# Patient Record
Sex: FEMALE | Race: WHITE | NOT HISPANIC OR LATINO | Employment: FULL TIME | RURAL
[De-identification: names, ages, dates, MRNs, and addresses within clinical notes are randomized per-mention and may not be internally consistent; named-entity substitution may affect disease eponyms.]

---

## 2022-02-10 ENCOUNTER — LAB VISIT (OUTPATIENT)
Dept: LAB | Facility: HOSPITAL | Age: 47
End: 2022-02-10
Attending: NURSE PRACTITIONER
Payer: COMMERCIAL

## 2022-02-10 DIAGNOSIS — E27.40 UNSPECIFIED ADRENOCORTICAL INSUFFICIENCY: Primary | ICD-10-CM

## 2022-02-10 PROCEDURE — 36415 COLL VENOUS BLD VENIPUNCTURE: CPT

## 2022-02-10 PROCEDURE — 82384 ASSAY THREE CATECHOLAMINES: CPT | Mod: 90

## 2022-02-10 PROCEDURE — 84402 ASSAY OF FREE TESTOSTERONE: CPT | Mod: 90

## 2022-02-10 PROCEDURE — 84403 ASSAY OF TOTAL TESTOSTERONE: CPT | Mod: 90

## 2022-02-17 LAB
COLLECT DURATION TIME UR: 24 H
DOPAMINE 24H UR-MRATE: 110 MCG/24 H (ref 65–400)
EPINEPH 24H UR-MRATE: 1.2 MCG/24 H
NOREPINEPH 24H UR-MRATE: 25 MCG/24 H (ref 15–80)
SPECIMEN VOL 24H UR: 700 ML

## 2022-02-18 LAB
TESTOST FREE SERPL-MCNC: 0.63 NG/DL (ref 0.06–0.95)
TESTOST SERPL-MCNC: 30 NG/DL (ref 8–60)

## 2022-02-23 DIAGNOSIS — M54.9 DORSALGIA: Primary | ICD-10-CM

## 2022-02-23 DIAGNOSIS — M54.50 LOW BACK PAIN: ICD-10-CM

## 2022-03-24 ENCOUNTER — CLINICAL SUPPORT (OUTPATIENT)
Dept: REHABILITATION | Facility: HOSPITAL | Age: 47
End: 2022-03-24
Payer: COMMERCIAL

## 2022-03-24 DIAGNOSIS — M54.50 LOW BACK PAIN, UNSPECIFIED BACK PAIN LATERALITY, UNSPECIFIED CHRONICITY, UNSPECIFIED WHETHER SCIATICA PRESENT: ICD-10-CM

## 2022-03-24 DIAGNOSIS — M54.50 LOW BACK PAIN: ICD-10-CM

## 2022-03-24 PROCEDURE — 97163 PT EVAL HIGH COMPLEX 45 MIN: CPT

## 2022-03-24 NOTE — PLAN OF CARE
[facility]   Physical Therapy Initial Evaluation    Name: Jaky Brown  Clinic Number: [id number]    Therapy Diagnosis:   Encounter Diagnosis   Name Primary?    Low back pain      Physician: Liliana Wolff F*    Physician Orders: Eval and treat    Evaluation Date: 3/24/2022      Visit # / Visits authorized: 6      Total Appointment Time (timed & untimed codes): 30 minutes      Subjective   Date of onset: > 6 months  History of current condition - Shante reports: Having history of low back pain.  She reports pain is usually worse with lifting or yard work.       Medical History:   BMI> 30, Anxiety, Asthma, Back pain, depression    Surgical History:   Jaky Brown  has no past surgical history on file.    Medications:   Jaky currently has no medications in their medication list.    Allergies:   Review of patient's allergies indicates:  Not on File       Prior Therapy: N/A  Social History: Family  Occupation: Homemaker  Prior Level of Function: Independent  Current Level of Function: See Foto tool scanned into media    Pain:  Current 6/10, worst 8/10, best 5/10   Location: bilateral back   Description: Aching and Deep  Aggravating Factors: Bending, Flexing and Lifting  Easing Factors: pain medication and rest    Pts goals: I want my back to get better    Objective     Forward bend: WFL  Back bend:  Limited  L  Trunk rotation: right 45 left 40    MANUAL MUSCLE TEST  Right left   Hip flexion MMT number: 5/5 MMT strength: 5/5   Hip abduction MMT strength: 4/5 MMT strength: 4+/5   Knee extension MMT strength: 5/5 MMT strength: 5/5   Knee flexion  MMT strength: 5/5 MMT strength: 5/5        Sit up Limited , increased difficulty noted           ROM/flexibility right left   Hip flexion (120) WFL WFL   Internal rotation (45) WFL WFL   External rotation (45) WFL WFL   Hamstring 90/90 (-10) -17 -15   Rectus femoris (120) Limited Limited               Special test Right  Left    SLR test <  60 degrees Negative Negative   SLR test > 60 degrees Negative Negative   Sitting slump test Negative Negative   Piriformis test Positive Negative   DORETHA test Negative Negative                    Gait assessment:   WFL no assistive device      Other test/information:    Bed Mobility: extra time and increased difficulty noted roll R/L    Palpation: Point tender L3-4, crepitus noted right knee      Limitation/Restriction for FOTO  Survey    Therapist reviewed FOTO scores for Jaky Brown on 3/24/2022.   FOTO documents entered into [employer] - see Media section.    Limitation Score: 57/100%         TREATMENT       Shante received the treatments listed below:  Eval only due to precert required ([id number])    Home Exercises and Patient Education Provided    Education provided:   - Next session    See EMR under Media for exercises provided next visit.    Assessment   Jaky is a 46 y.o. female referred to outpatient Physical Therapy with a medical diagnosis of low back pain.. Pt presents with positive MRI for disk herniation, decreased flexibility, difficulty with heavy lifting and pain.    Pt prognosis is Excellent.   Pt will benefit from skilled outpatient Physical Therapy to address the deficits stated above and in the chart below, provide pt/family education, and to maximize pt's level of independence.     Plan of care discussed with patient: Yes  Pt's spiritual, cultural and educational needs considered and patient is agreeable to the plan of care and goals as stated below:     Anticipated Barriers for therapy: None      Goals:  Short Term Goals: 1 weeks   1) I HOME EXERCISE PROGRAM  2) tolerate 30min of activity  3) decrease pain to 5/10    Long Term Goals: 3 weeks   1) increase function to 65/100% on FOTO tool  2) decrease pain to 3/10  3) normalize LE flexibilty    Plan   Plan of care Certification: 3/24/2022 to 4/24/2022.    Outpatient Physical Therapy 3 times weekly for 3 weeks total of 6 visits to include the  following interventions: Moist Heat/ Ice, Therapeutic Activities and Therapeutic Exercise.     Idris Shea PT         Physician:___________________________________________    Date:_______________________________________________

## 2022-03-28 ENCOUNTER — CLINICAL SUPPORT (OUTPATIENT)
Dept: REHABILITATION | Facility: HOSPITAL | Age: 47
End: 2022-03-28
Payer: COMMERCIAL

## 2022-03-28 DIAGNOSIS — M54.50 LOW BACK PAIN, UNSPECIFIED BACK PAIN LATERALITY, UNSPECIFIED CHRONICITY, UNSPECIFIED WHETHER SCIATICA PRESENT: Primary | ICD-10-CM

## 2022-03-28 PROCEDURE — 97110 THERAPEUTIC EXERCISES: CPT | Mod: CQ

## 2022-03-28 NOTE — PROGRESS NOTES
Physical Therapy Treatment Note     Name: Jaky Brown  Clinic Number: [id number]    Therapy Diagnosis:   Encounter Diagnosis   Name Primary?   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  Compliance with home exercise program: n/a; HEP given today.  Response to previous treatment: n/a  Functional change: n/a    Pain: 7/10  Location: across low back.    Objective     Shante received therapeutic exercises to develop strength, flexibility and core stabilization for 38 minutes including:  Exercises Day 2 Day 3 Day 4 Day 5  Day 6 Day 7  (sup Visit) Day 8 Day 9 Day 10  Day 11 Day 12   yeny hamstring stretch 0u16zmcw             yeny piriformis stretch 3l16aecs             yeny lumbar rotation 85p11jqhh             yeny SL quad stretch 6f78gvbg                                                                                           Shante received hot pack for 15 minutes to full back area.          Home Exercises Provided and Patient Education Provided     Education provided: Proper technique for supine<>sit transfers to avoid unnecessary back strain.    Written Home Exercises Provided: yes.  Exercises were reviewed and Shante was able to demonstrate them prior to the end of the session.  Shante demonstrated good  understanding of the education provided.     See EMR under Media for exercises provided prior visit.    Assessment       Shante Is progressing well towards her goals. Reports feeling less  tightness and pain post treatment.  Pt prognosis is Good.     Pt will continue to benefit from skilled outpatient physical therapy to address the deficits listed in the problem list box on initial evaluation, provide pt/family education and to maximize pt's level of independence in the home and community environment.     Pt's spiritual, cultural and educational needs considered and pt agreeable to plan of care and goals.     Anticipated barriers to physical therapy: none significant.    Goals:    Short Term Goals: 1 weeks   1) I HOME EXERCISE PROGRAM  2) tolerate 30min of activity  3) decrease pain to 5/10     Long Term Goals: 3 weeks   1) increase function to 65/100% on FOTO tool  2) decrease pain to 3/10  3) normalize LE flexibilty        Plan     Outpatient Physical Therapy 3 times weekly for 3 weeks total of 6 visits to include the following interventions: Moist Heat/ Ice, Therapeutic Activities and Therapeutic Exercise.        Juliette Rojo, PTA  3/28/2022

## 2022-04-01 ENCOUNTER — CLINICAL SUPPORT (OUTPATIENT)
Dept: REHABILITATION | Facility: HOSPITAL | Age: 47
End: 2022-04-01
Payer: COMMERCIAL

## 2022-04-01 DIAGNOSIS — M54.50 LOW BACK PAIN, UNSPECIFIED BACK PAIN LATERALITY, UNSPECIFIED CHRONICITY, UNSPECIFIED WHETHER SCIATICA PRESENT: Primary | ICD-10-CM

## 2022-04-01 PROCEDURE — 97530 THERAPEUTIC ACTIVITIES: CPT | Mod: CQ

## 2022-04-01 PROCEDURE — 97110 THERAPEUTIC EXERCISES: CPT | Mod: CQ

## 2022-04-01 NOTE — PROGRESS NOTES
"  Physical Therapy Treatment Note     Name: Jaky Brown  Clinic Number: [id number]    Therapy Diagnosis:   Encounter Diagnosis   Name Primary?    Low back pain, unspecified back pain laterality, unspecified chronicity, unspecified whether sciatica present Yes     Physician: Liliana Wolff F*    Visit Date: 4/1/2022    Physician Orders: PT eval and treat    Evaluation Date: 3/24/22    Visit # / Visits authorized: 3 / 7   PTA Visit #: 2        Precautions: none significant  Functional Level Prior to Evaluation: Independent    Subjective     Pt reports:   Compliance with home exercise program.  Response to previous treatment: mild soreness, not significant.  Functional change: little better.    Pain: 6/10  Location: across low back.    Objective     Shante received therapeutic exercises to develop strength, flexibility and core stabilization for 38 minutes including:  Exercises Day 2 Day 3 Day 4 Day 5  Day 6 Day 7  (sup Visit) Day 8 Day 9 Day 10  Day 11 Day 12   yeny hamstring stretch [id number] 2g23coxs            yeny piriformis stretch 5i01zbcr 9v33oisg            yeny lumbar rotation 66m57hmri 8n95fzxf            yeny SL quad stretch 4e33lldw 4c65cfzm            yeny SLR  11t8seng                          Theract    bike  lev2  10mins                                          Dynamic therapeutic activities completed as noted above to improve functional performance for 10 minutes.    VC's for correct therex technique.    Shante received hot pack for 15 minutes to full back area.          Home Exercises Provided and Patient Education Provided     Education provided: Proper technique for supine<>sit transfers to avoid unnecessary back strain.    Written Home Exercises Provided: Patient instructed to cont prior HEP.      Assessment       Shante Is progressing well towards her goals. Reports feeling less tightness post treatment and "I am feeling the new ones we did."  Pt prognosis is Good.     Pt will continue " to benefit from skilled outpatient physical therapy to address the deficits listed in the problem list box on initial evaluation, provide pt/family education and to maximize pt's level of independence in the home and community environment.     Pt's spiritual, cultural and educational needs considered and pt agreeable to plan of care and goals.     Anticipated barriers to physical therapy: none significant.    Goals:    Short Term Goals: 1 weeks   1) I HOME EXERCISE PROGRAM  2) tolerate 30min of activity  3) decrease pain to 5/10     Long Term Goals: 3 weeks   1) increase function to 65/100% on FOTO tool  2) decrease pain to 3/10  3) normalize LE flexibilty        Plan     Outpatient Physical Therapy 3 times weekly for 3 weeks total of 6 visits to include the following interventions: Moist Heat/ Ice, Therapeutic Activities and Therapeutic Exercise.      Delayed check in due to  issues.    Juliette Rojo, PTA  4/1/2022

## 2022-04-04 ENCOUNTER — CLINICAL SUPPORT (OUTPATIENT)
Dept: REHABILITATION | Facility: HOSPITAL | Age: 47
End: 2022-04-04
Payer: COMMERCIAL

## 2022-04-04 DIAGNOSIS — M54.50 LOW BACK PAIN, UNSPECIFIED BACK PAIN LATERALITY, UNSPECIFIED CHRONICITY, UNSPECIFIED WHETHER SCIATICA PRESENT: Primary | ICD-10-CM

## 2022-04-04 PROCEDURE — 97110 THERAPEUTIC EXERCISES: CPT | Mod: CQ

## 2022-04-04 PROCEDURE — 97530 THERAPEUTIC ACTIVITIES: CPT | Mod: CQ

## 2022-04-04 NOTE — PROGRESS NOTES
Physical Therapy Treatment Note     Name: Jaky Brown  Clinic Number: [id number]    Therapy Diagnosis:   Encounter Diagnosis   Name Primary?   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  Compliance with home exercise program.  Response to previous treatment: the hearing pad helps me a lot; I ordered one for home use.  Functional change: my life is very active and my pain increases with activity.    Pain: 6 or 7/10  Location: across low back.    Objective     Shante received therapeutic exercises to develop strength, flexibility and core stabilization for 36 minutes including:  Exercises Day 2 Day 3 Day 4 Day 5  Day 6 Day 7  (sup Visit) Day 8 Day 9 Day 10  Day 11 Day 12   yeny hamstring stretch 3y52bpfa 7c36xvgq 9w65pxvw           yeny piriformis stretch 9t38cswp 5o53otfr 3w18akpx           yeny lumbar rotation 64t59sfhd 6h70djjk 1o60mxsw           yeny SL quad stretch 0s22rpsz 7u32hcit 0g49pmnz           yeny SLR  47t2qvzd 51d85lkhq                         Theract    bike  lev2  10mins lev3  10mins                                         Dynamic therapeutic activities completed as noted above to improve functional performance for 10 minutes.    VC's for correct therex technique.    Shante received hot pack for 15 minutes to full back area.          Home Exercises Provided and Patient Education Provided     Education provided: Proper body mechanics, lifting strategies.    Written Home Exercises Provided: Patient instructed to cont prior  HEP.      Assessment       Shante Is progressing well towards her goals.  Pt prognosis is Good.     Pt will continue to benefit from skilled outpatient physical therapy to address the deficits listed in the problem list box on initial evaluation, provide pt/family education and to maximize pt's level of independence in the home and community environment.     Pt's spiritual, cultural and educational needs considered and pt agreeable to plan of care and goals.     Anticipated barriers to physical therapy: none significant.    Goals:    Short Term Goals: 1 weeks   1) I HOME EXERCISE PROGRAM  2) tolerate 30min of activity  3) decrease pain to 5/10     Long Term Goals: 3 weeks   1) increase function to 65/100% on FOTO tool  2) decrease pain to 3/10  3) normalize LE flexibilty        Plan     Outpatient Physical Therapy 3 times weekly for 3 weeks total of 6 visits to include the following interventions: Moist Heat/ Ice, Therapeutic Activities and Therapeutic Exercise.      Delayed check in due to  issues.    Juliette Rojo, PTA  4/4/2022

## 2022-04-06 ENCOUNTER — CLINICAL SUPPORT (OUTPATIENT)
Dept: REHABILITATION | Facility: HOSPITAL | Age: 47
End: 2022-04-06
Payer: COMMERCIAL

## 2022-04-06 DIAGNOSIS — M54.50 LOW BACK PAIN, UNSPECIFIED BACK PAIN LATERALITY, UNSPECIFIED CHRONICITY, UNSPECIFIED WHETHER SCIATICA PRESENT: Primary | ICD-10-CM

## 2022-04-06 PROCEDURE — 97110 THERAPEUTIC EXERCISES: CPT | Mod: CQ

## 2022-04-06 PROCEDURE — 97530 THERAPEUTIC ACTIVITIES: CPT | Mod: CQ

## 2022-04-06 NOTE — PROGRESS NOTES
Physical Therapy Treatment Note     Name: Jaky Brown  Clinic Number: [id number]    Therapy Diagnosis:   Encounter Diagnosis   Name Primary?   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  Compliance with home exercise program.  Response to previous treatment: the heating pad helps me a lot.  Functional change: my life is very active and my pain increases with activity.    Pain: 6 or 7/10  Location: across low back.    Objective     Shante received therapeutic exercises to develop strength, flexibility and core stabilization for 36 minutes including:  Exercises Day 2 Day 3 Day 4 Day 5  Day 6 Day 7  (sup Visit) Day 8 Day 9 Day 10  Day 11 Day 12   yeny hamstring stretch 9n08wraf 0l53uldv 6k20ilxe 6z31hfvj          yeny piriformis stretch 1h39qmkg 8u07vxjy 4k81xihz 3l41babs          yeny lumbar rotation 92u95hyeh 5n86pubk 5u64ilro 1m07saun          yeny SL quad stretch 6l81mmfv 0y90tuxs 0a17nvee 0l80ctoo          yeny SLR  77n6gomc 13s00giru 47v19gvic                        Theract    bike  lev2  10mins lev3  10mins lev3.5  10mins                                        Dynamic therapeutic activities completed as noted above to improve functional performance for 10 minutes at progressing resistance.    VC's for correct therex technique and postural awareness.    Shante received hot pack for 15 minutes to full back area.          Home Exercises Provided and Patient Education Provided     Education  provided: Review of proper body mechanics, lifting strategies.     Patient instructed to cont prior HEP.      Assessment       Shante Is progressing well towards her goals.  Pt prognosis is Good.     Pt will continue to benefit from skilled outpatient physical therapy to address the deficits listed in the problem list box on initial evaluation, provide pt/family education and to maximize pt's level of independence in the home and community environment.     Pt's spiritual, cultural and educational needs considered and pt agreeable to plan of care and goals.     Anticipated barriers to physical therapy: none significant.    Goals:    Short Term Goals: 1 weeks   1) I HOME EXERCISE PROGRAM  2) tolerate 30min of activity  3) decrease pain to 5/10     Long Term Goals: 3 weeks   1) increase function to 65/100% on FOTO tool  2) decrease pain to 3/10  3) normalize LE flexibilty        Plan     Outpatient Physical Therapy 3 times weekly for 3 weeks total of 6 visits to include the following interventions: Moist Heat/ Ice, Therapeutic Activities and Therapeutic Exercise.     Delayed check in time due to  issues.    Juliette Rojo, PTA  4/6/2022

## 2022-04-27 ENCOUNTER — CLINICAL SUPPORT (OUTPATIENT)
Dept: REHABILITATION | Facility: HOSPITAL | Age: 47
End: 2022-04-27
Payer: COMMERCIAL

## 2022-04-27 DIAGNOSIS — M54.50 LOW BACK PAIN, UNSPECIFIED BACK PAIN LATERALITY, UNSPECIFIED CHRONICITY, UNSPECIFIED WHETHER SCIATICA PRESENT: Primary | ICD-10-CM

## 2022-04-27 PROCEDURE — 97110 THERAPEUTIC EXERCISES: CPT

## 2022-04-27 NOTE — PROGRESS NOTES
Physical Therapy Treatment Note     Name: Jaky Brown  Clinic Number: [id number]    Therapy Diagnosis:   Encounter Diagnosis   Name Primary?    Low back pain, unspecified back pain laterality, unspecified chronicity, unspecified whether sciatica present Yes     Physician: Liliana Wolff F*    Visit Date: 4/27/2022    Physician Orders: PT Eval and Treat  Medical Diagnosis from Referral: M54.50 Low Back Pain  Evaluation Date: 3/24/2022  Authorization Period Expiration: 8 visits from [facility]  Updated Plan of Care Due: 4/29/2022  Visit # / Visits authorized: 6/7   PTA Visit #: 0    Time In: 8:05 AM  Time Out: 9:00 AM  Total Billable Time: 55 minutes    Precautions: Standard    Subjective     Pt reports: reports pain in her lower back without pain going down her legs. 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      Pain: 6/10  Location: Low back      Objective     Shante received therapeutic exercises to develop ROM, flexibility and core stabilization for 55 minutes including:  Nu-step x10 mins  BL Hamstring Stretch  BL Piriformis Stretch 3x20 sec  BL Lumbar Rotation 4x15 secs  BL  Quad Stretch 4x30 (Prone)  Bridges with ABD x30 GTB  Seated Antirotation x20 GTB  Donkey Kicks x25 BL  Deep Squats on railing   TA Activation/PPT x30 3 sec hold      Home Exercises Provided and Patient Education Provided     Education provided: on correct performance of therapeutic exercises for elimination of muscle substitutions and correct form    Written Home Exercises Provided: yes.  Exercises were reviewed and Shante was able to demonstrate them prior to the end of the session.  Shante demonstrated good  understanding of the education provided.     See EMR under Media for exercises provided prior visit.    Assessment     Patient tolerated tx well without complaints of increasing pain post therapy session.   Shante Is progressing well towards  her goals.   Pt prognosis is Excellent.     Pt will continue to benefit from skilled outpatient physical therapy to address the deficits listed in the problem list box on initial evaluation, provide pt/family education and to maximize pt's level of independence in the home and community environment.     Pt's spiritual, cultural and educational needs considered and pt agreeable to plan of care and goals.     Anticipated barriers to physical therapy: None    Goals:    Goals:  Short Term Goals: 1 weeks   1) I HOME EXERCISE PROGRAM  2) tolerate 30min of activity  3) decrease pain to 5/10     Long Term Goals: 3 weeks   1) increase function to 65/100% on FOTO tool  2) decrease pain to 3/10  3) normalize LE flexibilty      Plan     Continue with POC as indicated.     Sascha Childs, PT  4/27/2022

## 2022-05-02 ENCOUNTER — CLINICAL SUPPORT (OUTPATIENT)
Dept: REHABILITATION | Facility: HOSPITAL | Age: 47
End: 2022-05-02
Payer: COMMERCIAL

## 2022-05-02 DIAGNOSIS — M54.50 LOW BACK PAIN, UNSPECIFIED BACK PAIN LATERALITY, UNSPECIFIED CHRONICITY, UNSPECIFIED WHETHER SCIATICA PRESENT: Primary | ICD-10-CM

## 2022-05-02 PROCEDURE — 97110 THERAPEUTIC EXERCISES: CPT

## 2022-05-02 NOTE — PROGRESS NOTES
Physical Therapy Treatment Note      Name: Jaky Brown  Clinic Number: [id number]    Therapy Diagnosis:   No diagnosis found.  Physician: Liliana Wolff F*    Visit Date: 5/2/2022    Physician Orders: PT Eval and Treat  Medical Diagnosis from Referral: M54.50 Low Back Pain  Evaluation Date: 3/24/2022  Authorization Period Expiration: 8 visits from [facility]  Updated Plan of Care Due: 4/29/2022  Visit # / Visits authorized: 7/7   PTA Visit #: 0    Time In: 8:05 AM  Time Out: 8:38 AM  Total Billable Time: 33 minutes    Precautions: Standard    Subjective     Pt reports:mild low back pain  She was compliant with home exercise program.      Pain: 6/10  Location: Low back      Objective     Shante received therapeutic exercises to develop ROM, flexibility and core stabilization for 33 minutes including:  Nu-step x10 mins  BL Hamstring Stretch  BL Piriformis Stretch 3x15 sec  BL Lumbar on swiss ball x20  BL Lumbar Rotation 4x15 secs  BL  Quad Stretch 4x30 (Prone)  Bridges with ABD x30 GTB  Seated Antirotation x20 GTB  Donkey Kicks x25 BL  Deep Squats on railing   TA Activation/PPT x30 3 sec hold      Home Exercises Provided and Patient Education Provided     Education provided: on correct performance of therapeutic exercises for elimination of muscle substitutions and correct form    Written Home Exercises Provided: yes.  Exercises were reviewed and Shante was able to demonstrate them prior to the end of the session.  Shante demonstrated good  understanding of the education provided.     See EMR under Media for exercises provided prior visit.    Assessment     Patient tolerated tx well without complaints of increasing pain post therapy session.   Shante Is progressing well towards her goals.   Pt prognosis is Excellent.     Pt will continue to benefit from skilled outpatient physical therapy to address the deficits listed in the problem list box on initial evaluation, provide pt/family education and to  maximize pt's level of independence in the home and community environment.     Pt's spiritual, cultural and educational needs considered and pt agreeable to plan of care and goals.     Anticipated barriers to physical therapy: None    Goals:    Goals:  Short Term Goals: 1 weeks   1) I HOME EXERCISE PROGRAM  2) tolerate 30min of activity  3) decrease pain to 5/10     Long Term Goals: 3 weeks   1) increase function to 65/100% on FOTO tool  2) decrease pain to 3/10  3) normalize LE flexibilty      Plan     Patient will be discharged on this visit    Sascha Childs PT  5/2/2022

## 2022-05-02 NOTE — PROGRESS NOTES
Outpatient Therapy Discharge Summary     Name: Jaky Brown  Clinic Number: [id number]    Therapy Diagnosis:   Encounter Diagnosis   Name Primary?    Low back pain, unspecified back pain laterality, unspecified chronicity, unspecified whether sciatica present Yes     Physician: Liliana Wolff F*    Physician Orders: PT Eval and Treat  Medical Diagnosis: M54.50 Low Back Pain  Evaluation Date: 3/24/22    Date of Last visit: 5/2/2022  Total Visits Received: 7  Cancelled Visits: 1  No Show Visits: 0    Assessment      Goals:  Short Term Goals: 1 weeks   1) I HOME EXERCISE PROGRAM Status: Achieved  2) tolerate 30min of activity Status: Achieved  3) decrease pain to 5/10 Status: Not Achieved     Long Term Goals: 3 weeks Status: Not Achieved  1) increase function to 65/100% on FOTO tool  2) decrease pain to 3/10  3) normalize LE flexibilty    Patient continues to have significant low back pain. 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 Patient was educated on lifting mechanics and activity tolerance and provided with HEP of which patient states she will do her best to find time to complete them.     Discharge reason: Patient has reached the maximum rehab potential for the present time    Plan   This patient is discharged from Physical Therapy.

## 2022-10-26 DIAGNOSIS — M54.16 LUMBAR RADICULOPATHY: Primary | ICD-10-CM

## 2022-10-28 ENCOUNTER — OFFICE VISIT (OUTPATIENT)
Dept: SPINE | Facility: CLINIC | Age: 47
End: 2022-10-28
Payer: COMMERCIAL

## 2022-10-28 ENCOUNTER — HOSPITAL ENCOUNTER (OUTPATIENT)
Dept: RADIOLOGY | Facility: HOSPITAL | Age: 47
Discharge: HOME | End: 2022-10-28
Attending: ORTHOPAEDIC SURGERY
Payer: COMMERCIAL

## 2022-10-28 DIAGNOSIS — M54.16 LUMBAR RADICULOPATHY: ICD-10-CM

## 2022-10-28 DIAGNOSIS — M54.16 LUMBAR RADICULOPATHY: Primary | ICD-10-CM

## 2022-10-28 PROCEDURE — 99204 OFFICE O/P NEW MOD 45 MIN: CPT | Mod: S$PBB,,, | Performed by: ORTHOPAEDIC SURGERY

## 2022-10-28 PROCEDURE — 72110 XR LUMBAR SPINE 4-5 VIEW WITH BENDING VIEWS: ICD-10-PCS | Mod: 26,,, | Performed by: ORTHOPAEDIC SURGERY

## 2022-10-28 PROCEDURE — 99204 PR OFFICE/OUTPT VISIT, NEW, LEVL IV, 45-59 MIN: ICD-10-PCS | Mod: S$PBB,,, | Performed by: ORTHOPAEDIC SURGERY

## 2022-10-28 PROCEDURE — 72110 X-RAY EXAM L-2 SPINE 4/>VWS: CPT | Mod: TC

## 2022-10-28 PROCEDURE — 72110 X-RAY EXAM L-2 SPINE 4/>VWS: CPT | Mod: 26,,, | Performed by: ORTHOPAEDIC SURGERY

## 2022-10-28 PROCEDURE — 99212 OFFICE O/P EST SF 10 MIN: CPT | Mod: PBBFAC | Performed by: ORTHOPAEDIC SURGERY

## 2022-10-28 RX ORDER — PREGABALIN 75 MG/1
75 CAPSULE ORAL 2 TIMES DAILY
Qty: 60 CAPSULE | Refills: 5 | Status: SHIPPED | OUTPATIENT
Start: 2022-10-28 | End: 2023-04-28

## 2022-10-28 NOTE — PROGRESS NOTES
MDM/time:  Greater than 45 minutes spent on this encounter including 15 minutes reviewing imaging and notes, 20 minutes with the patient, 10 minutes documentation    ASSESSMENT:  46 y.o. female with lumbar spondylosis with radiculopathy    PLAN:  Lyrica 75 mg 1 tablet twice a day  Referral to pain management to discuss possible epidural injections  EMG studies upper and lower extremities  Follow-up after EMG study    HPI:  46 y.o. female here for evaluation of lower back pain that radiates into the right buttocks down the side of the right thigh stopping right below the knee.  Patient reports she initially was kicked by a horse in 1995 was told at that time she had bulging disc and was offered surgery she did not have surgery at that time.  She reports increased pain over the years and now having numbness and tingling in the right arm and right leg which are new symptoms for her.  She reports increased pain with mopping/vacuuming type of bending motion.  She also report and increased pain at bedtime and she is laying down.  She reports decreased  strength in the right hand.  Reports issues with balance has had multiple falls.  Denies bladder bowel incontinence.  Difficulty walking more than 60 ft.  No prior pain management.  Currently takes ibuprofen and gabapentin for pain.  She reports gabapentin does make her sleepy and unable to take this during the day.  She is had 6 weeks of physical therapy with little to no pain relief.  Prior MRI 10/03/2022.  No prior spine surgery.  Patient is nonsmoker.    IMAGING  10/28/2022 lumbar spine x-ray reviewed showed:  On the AP there is normal coronal alignment.  There are 5 non-rib-bearing lumbar vertebrae.  On the lateral there is decreased lumbar lordosis.  There is spondylotic disease with decreased disc height and osteophyte formation noted.  No fractures or listhesis noted.  No instability on flexion-extension views.    10/3/2022 MRI lumbar spine reviewed  showed:  L5-S1 moderate left/mild right lateral recess stenosis     No past medical history on file.  No past surgical history on file.      No current outpatient medications     EXAM:  Constitutional  General Appearance:  There is no height or weight on file to calculate BMI., NAD  Psychiatric   Orientation: Oriented to time, oriented to place, oriented to person  Mood and Affect: Active and alert, normal mood, normal affect  Gait and Station   Appearance:  Antalgic gait to the right, normal tandem gait, able to walk on toes, able to walk on heels  LUMBAR  Musculoskeletal System   Hips: Normal appearance, no leg length discrepancy, normal motion; left, normal motion; right    Lumbar Spine                   Inspection:  Normal alignment, normal sagittal balance                  Range of motion:  Decreased flexion, extension, lateral bending, rotation. Pain with range of motion                  Bony Palpation of the Lumbar Spine:  No tenderness of the spinous process, no tenderness of the sacrum, no tenderness of the coccyx                  Bony Palpation of the Right Hip:  No tenderness of the iliac crest, no tenderness of the sciatic notch, no tenderness of the SI joint                  Bony Palpation of the Left Hip:  No tenderness of the iliac crest, no tenderness of the sciatic notch, no tenderness of the SI joint                  Soft Tissue Palpation on the Right:  No tenderness of the paraspinal region, no tenderness of the iliolumbar region                  Soft Tissue Palpation on the Left:  No tenderness of the paraspinal region, no tenderness of the iliolumbar region    Motor Strength   L1 Right:  Hip flexion iliopsoas 5/5    L1 Left:  Hip flexion iliopsoas 5/5              L2-L4 Right:  Knee extension quadriceps 5/5, tibialis anterior 5/5              L2-L4 Left:  Knee extension quadriceps 5/5, tibialis anterior 5/5   L5 Right:  Extensor hallucis llongus 5/5,    L5 Left:  Extensor hallucis longus 5/5,     S1 Right:  Plantar flexion gastrocnemius 5/5   S1 Left:  Plantar flexion gastrocnemius 5/5    Neurological System   Ankle Reflex Right:  normal   Ankle Reflex Left: normal   Knee Reflex Right:  normal   Knee Reflex Left:  normal   Sensation on the Right:  L2 normal, L3 normal, L4 normal, L5 normal, S1 normal   Sensation on the Left:  L2 normal, L3 normal, L4 normal, L5 normal, S1 normal              Special Test on the Right:  Seated straight leg raising test negative, no clonus of the ankle              Special Test on the Left:  Seated straight leg raising test negative, no clonus of the ankle    Skin   Lumbosacral Spine:  Normal skin    Cardiovascular System   Arterial Pulses Right:  Posterior tibialis normal, dorsalis pedis normal   Arterial Pulses Left:  Posterior tibialis normal, dorsalis pedis normal   Edema Right: None   Edema Left:  None